# Patient Record
Sex: FEMALE | Race: WHITE | NOT HISPANIC OR LATINO | Employment: OTHER | URBAN - METROPOLITAN AREA
[De-identification: names, ages, dates, MRNs, and addresses within clinical notes are randomized per-mention and may not be internally consistent; named-entity substitution may affect disease eponyms.]

---

## 2017-05-03 NOTE — PATIENT DISCUSSION
1.  Refractive error Glasses change optional. 2.  Combined Types of Cataract OU: Explained how cataracts can effect vision. Recommend clinical observation. The patient was advised to contact us if any change or worsening of vision. 3.  DM II without sign of Diabetic Retinopathy OU:  Discussed the pathophysiology of diabetes and its effect on the eye. Stressed the importance of regular followup and good control of BS BP and Lipids to avoid future complications. 4. PVD OD: Patient was cautioned to call our office immediately if they experience a substantial change in their symptoms such as an increase in floaters persistent flashes loss of visual field (may appear as a shadow or a curtain) or decrease in visual acuity as these may indicate a retinal tear or detachment. If this is a new problem patient will need to return for re-examination  as determined by the [address]   [place]:

## 2017-11-20 ENCOUNTER — ESTABLISHED PATIENT (OUTPATIENT)
Dept: URBAN - METROPOLITAN AREA CLINIC 35 | Facility: CLINIC | Age: 62
End: 2017-11-20

## 2017-11-20 DIAGNOSIS — H35.371: ICD-10-CM

## 2017-11-20 DIAGNOSIS — H43.813: ICD-10-CM

## 2017-11-20 PROCEDURE — 92225 OPHTHALMOSCOPY (INITIAL): CPT

## 2017-11-20 PROCEDURE — 92014 COMPRE OPH EXAM EST PT 1/>: CPT

## 2017-11-20 PROCEDURE — 92134 CPTRZ OPH DX IMG PST SGM RTA: CPT

## 2017-11-20 ASSESSMENT — TONOMETRY
OS_IOP_MMHG: 14
OD_IOP_MMHG: 13

## 2017-11-20 ASSESSMENT — VISUAL ACUITY
OS_CC: 20/20-1
OD_CC: 20/20-2

## 2018-09-12 ENCOUNTER — ESTABLISHED COMPREHENSIVE EXAM (OUTPATIENT)
Dept: URBAN - METROPOLITAN AREA CLINIC 35 | Facility: CLINIC | Age: 63
End: 2018-09-12

## 2018-09-12 DIAGNOSIS — H35.371: ICD-10-CM

## 2018-09-12 DIAGNOSIS — H43.813: ICD-10-CM

## 2018-09-12 DIAGNOSIS — H25.813: ICD-10-CM

## 2018-09-12 PROCEDURE — 92134 CPTRZ OPH DX IMG PST SGM RTA: CPT

## 2018-09-12 PROCEDURE — 92014 COMPRE OPH EXAM EST PT 1/>: CPT

## 2018-09-12 PROCEDURE — 92015 DETERMINE REFRACTIVE STATE: CPT

## 2018-09-12 ASSESSMENT — TONOMETRY
OS_IOP_MMHG: 15
OD_IOP_MMHG: 14

## 2018-09-12 ASSESSMENT — VISUAL ACUITY
OD_CC: J1
OD_BAT: 20/25
OS_BAT: 20/25
OD_CC: 20/20
OS_CC: 20/20-1
OS_CC: J2

## 2018-12-06 ENCOUNTER — ESTABLISHED COMPREHENSIVE EXAM (OUTPATIENT)
Dept: URBAN - METROPOLITAN AREA CLINIC 35 | Facility: CLINIC | Age: 63
End: 2018-12-06

## 2018-12-06 DIAGNOSIS — H33.311: ICD-10-CM

## 2018-12-06 DIAGNOSIS — H35.371: ICD-10-CM

## 2018-12-06 DIAGNOSIS — H43.813: ICD-10-CM

## 2018-12-06 PROCEDURE — 9222650 BILAT EXTENDED OPHTHALMOSCOPY, F/U

## 2018-12-06 PROCEDURE — 92014 COMPRE OPH EXAM EST PT 1/>: CPT

## 2018-12-06 PROCEDURE — 92134 CPTRZ OPH DX IMG PST SGM RTA: CPT

## 2018-12-06 ASSESSMENT — VISUAL ACUITY
OD_CC: 20/20
OS_CC: 20/20-1

## 2018-12-06 ASSESSMENT — TONOMETRY
OD_IOP_MMHG: 12
OS_IOP_MMHG: 12

## 2019-11-20 ENCOUNTER — ESTABLISHED COMPREHENSIVE EXAM (OUTPATIENT)
Dept: URBAN - METROPOLITAN AREA CLINIC 35 | Facility: CLINIC | Age: 64
End: 2019-11-20

## 2019-11-20 DIAGNOSIS — H52.203: ICD-10-CM

## 2019-11-20 DIAGNOSIS — H52.13: ICD-10-CM

## 2019-11-20 DIAGNOSIS — H25.813: ICD-10-CM

## 2019-11-20 DIAGNOSIS — H52.4: ICD-10-CM

## 2019-11-20 PROCEDURE — 92015 DETERMINE REFRACTIVE STATE: CPT

## 2019-11-20 PROCEDURE — 92014 COMPRE OPH EXAM EST PT 1/>: CPT

## 2019-11-20 ASSESSMENT — VISUAL ACUITY
OS_CC: J1
OS_CC: 20/20
OD_CC: 20/20
OD_CC: J2-

## 2019-11-20 ASSESSMENT — TONOMETRY
OD_IOP_MMHG: 15
OS_IOP_MMHG: 15

## 2020-04-06 ENCOUNTER — EST. PATIENT EMERGENCY (OUTPATIENT)
Dept: URBAN - METROPOLITAN AREA CLINIC 38 | Facility: CLINIC | Age: 65
End: 2020-04-06

## 2020-04-06 DIAGNOSIS — H43.813: ICD-10-CM

## 2020-04-06 PROCEDURE — 92012 INTRM OPH EXAM EST PATIENT: CPT

## 2020-04-06 ASSESSMENT — VISUAL ACUITY
OD_CC: 20/20
OS_CC: 20/20

## 2020-04-06 ASSESSMENT — TONOMETRY
OS_IOP_MMHG: 16
OD_IOP_MMHG: 17

## 2020-05-05 ENCOUNTER — ESTABLISHED PATIENT (OUTPATIENT)
Dept: URBAN - METROPOLITAN AREA CLINIC 35 | Facility: CLINIC | Age: 65
End: 2020-05-05

## 2020-05-05 DIAGNOSIS — H43.813: ICD-10-CM

## 2020-05-05 DIAGNOSIS — H25.813: ICD-10-CM

## 2020-05-05 PROCEDURE — 92012 INTRM OPH EXAM EST PATIENT: CPT

## 2020-05-05 ASSESSMENT — VISUAL ACUITY
OD_CC: 20/20-1
OS_CC: 20/20-2

## 2020-05-05 ASSESSMENT — TONOMETRY
OD_IOP_MMHG: 14
OS_IOP_MMHG: 14

## 2020-12-02 ENCOUNTER — ESTABLISHED COMPREHENSIVE EXAM (OUTPATIENT)
Dept: URBAN - METROPOLITAN AREA CLINIC 35 | Facility: CLINIC | Age: 65
End: 2020-12-02

## 2020-12-02 DIAGNOSIS — H52.13: ICD-10-CM

## 2020-12-02 PROCEDURE — 92015 DETERMINE REFRACTIVE STATE: CPT

## 2020-12-02 PROCEDURE — 92014 COMPRE OPH EXAM EST PT 1/>: CPT

## 2020-12-02 ASSESSMENT — TONOMETRY
OD_IOP_MMHG: 12
OS_IOP_MMHG: 12

## 2020-12-02 ASSESSMENT — VISUAL ACUITY
OD_CC: 20/20
OS_CC: 20/25
OD_CC: J3
OS_CC: J2

## 2022-06-14 ENCOUNTER — COMPREHENSIVE EXAM (OUTPATIENT)
Dept: URBAN - METROPOLITAN AREA CLINIC 35 | Facility: CLINIC | Age: 67
End: 2022-06-14

## 2022-06-14 DIAGNOSIS — H43.813: ICD-10-CM

## 2022-06-14 DIAGNOSIS — H35.371: ICD-10-CM

## 2022-06-14 DIAGNOSIS — H25.13: ICD-10-CM

## 2022-06-14 DIAGNOSIS — H33.311: ICD-10-CM

## 2022-06-14 PROCEDURE — 92250 FUNDUS PHOTOGRAPHY W/I&R: CPT

## 2022-06-14 PROCEDURE — 92134 CPTRZ OPH DX IMG PST SGM RTA: CPT

## 2022-06-14 PROCEDURE — 92014 COMPRE OPH EXAM EST PT 1/>: CPT

## 2022-06-14 ASSESSMENT — TONOMETRY
OD_IOP_MMHG: 12
OS_IOP_MMHG: 12

## 2022-06-14 ASSESSMENT — VISUAL ACUITY
OU_CC: 20/25
OD_SC: J2
OS_CC: 20/25
OS_SC: J6
OD_CC: 20/30
OU_SC: J2

## 2023-07-05 ENCOUNTER — COMPREHENSIVE EXAM (OUTPATIENT)
Dept: URBAN - METROPOLITAN AREA CLINIC 35 | Facility: CLINIC | Age: 68
End: 2023-07-05

## 2023-07-05 DIAGNOSIS — H43.813: ICD-10-CM

## 2023-07-05 DIAGNOSIS — H35.371: ICD-10-CM

## 2023-07-05 DIAGNOSIS — H47.323: ICD-10-CM

## 2023-07-05 DIAGNOSIS — H25.813: ICD-10-CM

## 2023-07-05 DIAGNOSIS — H25.13: ICD-10-CM

## 2023-07-05 DIAGNOSIS — H04.123: ICD-10-CM

## 2023-07-05 DIAGNOSIS — H33.311: ICD-10-CM

## 2023-07-05 PROCEDURE — 92014 COMPRE OPH EXAM EST PT 1/>: CPT

## 2023-07-05 ASSESSMENT — TONOMETRY
OD_IOP_MMHG: 14
OS_IOP_MMHG: 16

## 2023-07-05 ASSESSMENT — VISUAL ACUITY
OD_SC: 20/400
OS_SC: 20/60-2
OU_CC: J1
OD_CC: 20/40
OS_CC: 20/40-1

## 2023-07-31 ENCOUNTER — CONSULTATION/EVALUATION (OUTPATIENT)
Dept: URBAN - METROPOLITAN AREA CLINIC 35 | Facility: CLINIC | Age: 68
End: 2023-07-31

## 2023-07-31 DIAGNOSIS — H25.813: ICD-10-CM

## 2023-07-31 DIAGNOSIS — H35.371: ICD-10-CM

## 2023-07-31 DIAGNOSIS — H43.813: ICD-10-CM

## 2023-07-31 DIAGNOSIS — H33.311: ICD-10-CM

## 2023-07-31 DIAGNOSIS — H47.323: ICD-10-CM

## 2023-07-31 DIAGNOSIS — H04.123: ICD-10-CM

## 2023-07-31 PROCEDURE — 92014 COMPRE OPH EXAM EST PT 1/>: CPT

## 2023-07-31 PROCEDURE — 92136TC INTERFEROMETRY - TECHNICAL COMPONENT

## 2023-07-31 PROCEDURE — V2799PMN IMPRIMIS PRED-MOXI-NEPAF 5ML

## 2023-07-31 ASSESSMENT — VISUAL ACUITY
OS_CC: 20/50+2
OD_CC: 20/40
OU_CC: J1

## 2023-07-31 ASSESSMENT — TONOMETRY
OS_IOP_MMHG: 19
OD_IOP_MMHG: 19

## 2023-09-13 ENCOUNTER — PRE-OP/H&P (OUTPATIENT)
Dept: URBAN - METROPOLITAN AREA SURGERY 14 | Facility: SURGERY | Age: 68
End: 2023-09-13

## 2023-09-13 ENCOUNTER — SURGERY/PROCEDURE (OUTPATIENT)
Dept: URBAN - METROPOLITAN AREA CLINIC 35 | Facility: CLINIC | Age: 68
End: 2023-09-13

## 2023-09-13 DIAGNOSIS — H47.323: ICD-10-CM

## 2023-09-13 DIAGNOSIS — H43.813: ICD-10-CM

## 2023-09-13 DIAGNOSIS — H25.813: ICD-10-CM

## 2023-09-13 DIAGNOSIS — H35.371: ICD-10-CM

## 2023-09-13 DIAGNOSIS — H33.311: ICD-10-CM

## 2023-09-13 DIAGNOSIS — H04.123: ICD-10-CM

## 2023-09-13 PROCEDURE — 66984 XCAPSL CTRC RMVL W/O ECP: CPT

## 2023-09-13 PROCEDURE — 99211T TECH SERVICE

## 2023-09-14 ENCOUNTER — POST-OP (OUTPATIENT)
Dept: URBAN - METROPOLITAN AREA CLINIC 35 | Facility: CLINIC | Age: 68
End: 2023-09-14

## 2023-09-14 DIAGNOSIS — Z96.1: ICD-10-CM

## 2023-09-14 PROCEDURE — 99024 POSTOP FOLLOW-UP VISIT: CPT

## 2023-09-14 ASSESSMENT — VISUAL ACUITY
OS_SC: 20/50-2
OU_SC: 20/30-2
OS_PH: 20/30
OD_SC: 20/30-1

## 2023-09-14 ASSESSMENT — TONOMETRY
OD_IOP_MMHG: 18
OS_IOP_MMHG: 20

## 2023-09-19 ENCOUNTER — POST-OP (OUTPATIENT)
Dept: URBAN - METROPOLITAN AREA CLINIC 35 | Facility: CLINIC | Age: 68
End: 2023-09-19

## 2023-09-19 DIAGNOSIS — Z96.1: ICD-10-CM

## 2023-09-19 DIAGNOSIS — H25.812: ICD-10-CM

## 2023-09-19 PROCEDURE — 99213 OFFICE O/P EST LOW 20 MIN: CPT

## 2023-09-19 PROCEDURE — 99024 POSTOP FOLLOW-UP VISIT: CPT

## 2023-09-19 ASSESSMENT — VISUAL ACUITY
OU_SC: 20/30-2
OD_SC: 20/30-2
OS_SC: 20/50-2

## 2023-09-19 ASSESSMENT — TONOMETRY
OD_IOP_MMHG: 18
OS_IOP_MMHG: 20

## 2023-09-20 ENCOUNTER — SURGERY/PROCEDURE (OUTPATIENT)
Dept: URBAN - METROPOLITAN AREA CLINIC 35 | Facility: CLINIC | Age: 68
End: 2023-09-20

## 2023-09-20 DIAGNOSIS — H21.81: ICD-10-CM

## 2023-09-20 DIAGNOSIS — H25.813: ICD-10-CM

## 2023-09-20 PROCEDURE — 66984 XCAPSL CTRC RMVL W/O ECP: CPT

## 2023-09-21 ENCOUNTER — POST-OP (OUTPATIENT)
Dept: URBAN - METROPOLITAN AREA CLINIC 35 | Facility: CLINIC | Age: 68
End: 2023-09-21

## 2023-09-21 DIAGNOSIS — Z96.1: ICD-10-CM

## 2023-09-21 PROCEDURE — 99024 POSTOP FOLLOW-UP VISIT: CPT

## 2023-09-21 ASSESSMENT — TONOMETRY: OS_IOP_MMHG: 19

## 2023-09-21 ASSESSMENT — VISUAL ACUITY: OD_SC: 20/25-2

## 2023-10-05 ENCOUNTER — POST-OP (OUTPATIENT)
Dept: URBAN - METROPOLITAN AREA CLINIC 35 | Facility: CLINIC | Age: 68
End: 2023-10-05

## 2023-10-05 DIAGNOSIS — Z96.1: ICD-10-CM

## 2023-10-05 PROCEDURE — 99024 POSTOP FOLLOW-UP VISIT: CPT

## 2023-10-05 ASSESSMENT — VISUAL ACUITY
OD_SC: 20/40
OS_SC: 20/30-1

## 2023-10-05 ASSESSMENT — TONOMETRY
OD_IOP_MMHG: 14
OS_IOP_MMHG: 15

## 2023-10-19 ENCOUNTER — EMERGENCY VISIT (OUTPATIENT)
Dept: URBAN - METROPOLITAN AREA CLINIC 35 | Facility: CLINIC | Age: 68
End: 2023-10-19

## 2023-10-19 DIAGNOSIS — H11.32: ICD-10-CM

## 2023-10-19 PROCEDURE — 99213 OFFICE O/P EST LOW 20 MIN: CPT | Mod: 24

## 2023-10-19 ASSESSMENT — TONOMETRY
OS_IOP_MMHG: 15
OD_IOP_MMHG: 15

## 2023-10-19 ASSESSMENT — VISUAL ACUITY
OS_SC: 20/30
OD_PH: 20/25-1
OD_SC: 20/40
OU_SC: 20/25

## 2024-01-23 ENCOUNTER — COMPREHENSIVE EXAM (OUTPATIENT)
Dept: URBAN - METROPOLITAN AREA CLINIC 35 | Facility: CLINIC | Age: 69
End: 2024-01-23

## 2024-01-23 DIAGNOSIS — H43.813: ICD-10-CM

## 2024-01-23 DIAGNOSIS — H26.492: ICD-10-CM

## 2024-01-23 DIAGNOSIS — H47.323: ICD-10-CM

## 2024-01-23 DIAGNOSIS — H35.371: ICD-10-CM

## 2024-01-23 DIAGNOSIS — H04.123: ICD-10-CM

## 2024-01-23 DIAGNOSIS — Z96.1: ICD-10-CM

## 2024-01-23 PROCEDURE — 92134 CPTRZ OPH DX IMG PST SGM RTA: CPT

## 2024-01-23 PROCEDURE — 92133 CPTRZD OPH DX IMG PST SGM ON: CPT

## 2024-01-23 PROCEDURE — 92014 COMPRE OPH EXAM EST PT 1/>: CPT

## 2024-01-23 ASSESSMENT — VISUAL ACUITY
OD_PH: 20/30
OS_PH: 20/25-1
OU_SC: J6
OS_SC: J10
OD_SC: J8
OD_SC: 20/40-1
OU_SC: 20/30-1
OS_SC: 20/40-1

## 2024-01-23 ASSESSMENT — TONOMETRY
OS_IOP_MMHG: 16
OD_IOP_MMHG: 16

## 2024-03-13 ENCOUNTER — CONSULTATION/EVALUATION (OUTPATIENT)
Dept: URBAN - METROPOLITAN AREA CLINIC 39 | Facility: CLINIC | Age: 69
End: 2024-03-13

## 2024-03-13 ENCOUNTER — SURGERY/PROCEDURE (OUTPATIENT)
Facility: LOCATION | Age: 69
End: 2024-03-13

## 2024-03-13 DIAGNOSIS — H26.492: ICD-10-CM

## 2024-03-13 DIAGNOSIS — H04.123: ICD-10-CM

## 2024-03-13 DIAGNOSIS — H26.493: ICD-10-CM

## 2024-03-13 DIAGNOSIS — H47.323: ICD-10-CM

## 2024-03-13 DIAGNOSIS — Z96.1: ICD-10-CM

## 2024-03-13 DIAGNOSIS — H35.371: ICD-10-CM

## 2024-03-13 PROCEDURE — 6682150 YAG CAPSULOTOMY

## 2024-03-13 PROCEDURE — 99213 OFFICE O/P EST LOW 20 MIN: CPT

## 2024-03-13 ASSESSMENT — TONOMETRY
OS_IOP_MMHG: 16
OD_IOP_MMHG: 16

## 2024-03-13 ASSESSMENT — VISUAL ACUITY
OD_BAT: 20/50 W/ MR
OS_BAT: 20/50 W/MR
OS_SC: 20/30-1
OD_SC: 20/30-1

## 2024-03-20 ENCOUNTER — POST-OP (OUTPATIENT)
Dept: URBAN - METROPOLITAN AREA CLINIC 35 | Facility: CLINIC | Age: 69
End: 2024-03-20

## 2024-03-20 DIAGNOSIS — H04.123: ICD-10-CM

## 2024-03-20 DIAGNOSIS — Z96.1: ICD-10-CM

## 2024-03-20 DIAGNOSIS — Z98.890: ICD-10-CM

## 2024-03-20 PROCEDURE — 99024 POSTOP FOLLOW-UP VISIT: CPT

## 2024-03-20 ASSESSMENT — VISUAL ACUITY
OD_SC: 20/30-1
OS_SC: 20/30-1
OU_SC: 20/25-1

## 2024-03-20 ASSESSMENT — TONOMETRY
OS_IOP_MMHG: 18
OD_IOP_MMHG: 18

## 2024-05-08 ENCOUNTER — ESTABLISHED PATIENT (OUTPATIENT)
Dept: URBAN - METROPOLITAN AREA CLINIC 35 | Facility: CLINIC | Age: 69
End: 2024-05-08

## 2024-05-08 DIAGNOSIS — L98.8: ICD-10-CM

## 2024-05-08 DIAGNOSIS — H02.835: ICD-10-CM

## 2024-05-08 DIAGNOSIS — H02.832: ICD-10-CM

## 2024-05-08 DIAGNOSIS — H02.831: ICD-10-CM

## 2024-05-08 DIAGNOSIS — H02.834: ICD-10-CM

## 2024-05-08 DIAGNOSIS — H04.123: ICD-10-CM

## 2024-05-08 PROCEDURE — 92285 EXTERNAL OCULAR PHOTOGRAPHY: CPT

## 2024-05-08 PROCEDURE — 99214 OFFICE O/P EST MOD 30 MIN: CPT | Mod: 24

## 2024-05-08 ASSESSMENT — VISUAL ACUITY
OD_SC: 20/30-1
OS_SC: 20/30-1

## 2024-05-16 ENCOUNTER — CONTACT LENSES/GLASSES VISIT (OUTPATIENT)
Dept: URBAN - METROPOLITAN AREA CLINIC 35 | Facility: CLINIC | Age: 69
End: 2024-05-16

## 2024-05-16 DIAGNOSIS — H02.835: ICD-10-CM

## 2024-05-16 DIAGNOSIS — H02.832: ICD-10-CM

## 2024-05-16 DIAGNOSIS — H02.834: ICD-10-CM

## 2024-05-16 PROCEDURE — 92012 INTRM OPH EXAM EST PATIENT: CPT

## 2024-05-16 ASSESSMENT — VISUAL ACUITY
OU_CC: 20/25
OD_CC: 20/30-1
OD_CC: J8
OU_CC: J3
OS_CC: J4
OS_CC: 20/25

## 2024-05-20 ENCOUNTER — TECH ONLY (OUTPATIENT)
Dept: URBAN - METROPOLITAN AREA CLINIC 39 | Facility: CLINIC | Age: 69
End: 2024-05-20

## 2024-05-20 DIAGNOSIS — H02.831: ICD-10-CM

## 2024-05-20 DIAGNOSIS — H02.835: ICD-10-CM

## 2024-05-20 DIAGNOSIS — H02.832: ICD-10-CM

## 2024-05-20 DIAGNOSIS — H02.834: ICD-10-CM

## 2024-05-20 PROCEDURE — 92081 LIMITED VISUAL FIELD XM: CPT

## 2024-11-11 ENCOUNTER — SURGERY/PROCEDURE (OUTPATIENT)
Facility: LOCATION | Age: 69
End: 2024-11-11

## 2024-11-11 ENCOUNTER — PRE-OP/H&P (OUTPATIENT)
Dept: URBAN - METROPOLITAN AREA CLINIC 39 | Facility: CLINIC | Age: 69
End: 2024-11-11

## 2024-11-11 DIAGNOSIS — H02.832: ICD-10-CM

## 2024-11-11 DIAGNOSIS — H02.834: ICD-10-CM

## 2024-11-11 DIAGNOSIS — H02.831: ICD-10-CM

## 2024-11-11 DIAGNOSIS — H02.835: ICD-10-CM

## 2024-11-11 DIAGNOSIS — L98.8: ICD-10-CM

## 2024-11-11 DIAGNOSIS — H04.123: ICD-10-CM

## 2024-11-11 PROCEDURE — 1582350 UPPER BLEPH PER EYE FUNCTIONAL-BILATERAL: Mod: 50

## 2024-11-11 PROCEDURE — 99211T TECH SERVICE

## 2024-11-20 ENCOUNTER — POST-OP (OUTPATIENT)
Dept: URBAN - METROPOLITAN AREA CLINIC 35 | Facility: CLINIC | Age: 69
End: 2024-11-20

## 2024-11-20 DIAGNOSIS — Z98.890: ICD-10-CM

## 2024-11-20 PROCEDURE — 92285 EXTERNAL OCULAR PHOTOGRAPHY: CPT

## 2024-11-20 PROCEDURE — 99024 POSTOP FOLLOW-UP VISIT: CPT

## 2024-12-10 ENCOUNTER — COMPREHENSIVE EXAM (OUTPATIENT)
Age: 69
End: 2024-12-10

## 2024-12-10 DIAGNOSIS — H47.323: ICD-10-CM

## 2024-12-10 DIAGNOSIS — H35.371: ICD-10-CM

## 2024-12-10 DIAGNOSIS — H52.7: ICD-10-CM

## 2024-12-10 DIAGNOSIS — H33.311: ICD-10-CM

## 2024-12-10 DIAGNOSIS — Z96.1: ICD-10-CM

## 2024-12-10 DIAGNOSIS — Z98.890: ICD-10-CM

## 2024-12-10 PROCEDURE — 92250 FUNDUS PHOTOGRAPHY W/I&R: CPT

## 2024-12-10 PROCEDURE — 92015 DETERMINE REFRACTIVE STATE: CPT

## 2024-12-10 PROCEDURE — 92014 COMPRE OPH EXAM EST PT 1/>: CPT

## 2025-04-03 ENCOUNTER — EMERGENCY VISIT (OUTPATIENT)
Age: 70
End: 2025-04-03

## 2025-04-03 DIAGNOSIS — Z96.1: ICD-10-CM

## 2025-04-03 DIAGNOSIS — H35.371: ICD-10-CM

## 2025-04-03 DIAGNOSIS — H47.323: ICD-10-CM

## 2025-04-03 DIAGNOSIS — H33.311: ICD-10-CM

## 2025-04-03 DIAGNOSIS — H10.9: ICD-10-CM

## 2025-04-03 DIAGNOSIS — H04.123: ICD-10-CM

## 2025-04-03 PROCEDURE — 92012 INTRM OPH EXAM EST PATIENT: CPT

## 2025-04-03 RX ORDER — TOBRAMYCIN AND DEXAMETHASONE 1; 3 MG/ML; MG/ML
1 SUSPENSION/ DROPS OPHTHALMIC TWICE A DAY
Start: 2025-04-03 | End: 2025-04-17

## 2025-04-29 ENCOUNTER — FOLLOW UP (OUTPATIENT)
Age: 70
End: 2025-04-29

## 2025-04-29 DIAGNOSIS — H04.123: ICD-10-CM

## 2025-04-29 DIAGNOSIS — H10.9: ICD-10-CM

## 2025-04-29 DIAGNOSIS — H11.822: ICD-10-CM

## 2025-04-29 DIAGNOSIS — H02.206: ICD-10-CM

## 2025-04-29 PROCEDURE — A4262 TEMPORARY TEAR DUCT PLUG: HCPCS | Mod: 50

## 2025-04-29 PROCEDURE — 68761Q PUNCTAL PLUG/QUINTESS DISSOLVABLE PLUG/EACH: Mod: 50

## 2025-04-29 PROCEDURE — 92012 INTRM OPH EXAM EST PATIENT: CPT

## 2025-05-12 ENCOUNTER — EMERGENCY VISIT (OUTPATIENT)
Age: 70
End: 2025-05-12

## 2025-05-12 DIAGNOSIS — H04.123: ICD-10-CM

## 2025-05-12 DIAGNOSIS — H11.32: ICD-10-CM

## 2025-05-12 DIAGNOSIS — H02.831: ICD-10-CM

## 2025-05-12 DIAGNOSIS — H02.834: ICD-10-CM

## 2025-05-12 DIAGNOSIS — H10.45: ICD-10-CM

## 2025-05-12 PROCEDURE — 99213 OFFICE O/P EST LOW 20 MIN: CPT

## 2025-05-12 RX ORDER — NEOMYCIN SULFATE, POLYMYXIN B SULFATE AND DEXAMETHASONE 3.5; 10000; 1 MG/ML; [USP'U]/ML; MG/ML: 1 SUSPENSION OPHTHALMIC
